# Patient Record
Sex: FEMALE | Race: BLACK OR AFRICAN AMERICAN | NOT HISPANIC OR LATINO | Employment: FULL TIME | RURAL
[De-identification: names, ages, dates, MRNs, and addresses within clinical notes are randomized per-mention and may not be internally consistent; named-entity substitution may affect disease eponyms.]

---

## 2017-02-07 ENCOUNTER — OFFICE VISIT (OUTPATIENT)
Dept: RETAIL CLINIC | Facility: CLINIC | Age: 37
End: 2017-02-07

## 2017-02-07 VITALS
OXYGEN SATURATION: 97 % | SYSTOLIC BLOOD PRESSURE: 124 MMHG | RESPIRATION RATE: 16 BRPM | WEIGHT: 208 LBS | HEIGHT: 61 IN | HEART RATE: 76 BPM | TEMPERATURE: 97.7 F | DIASTOLIC BLOOD PRESSURE: 70 MMHG | BODY MASS INDEX: 39.27 KG/M2

## 2017-02-07 DIAGNOSIS — T16.1XXA FOREIGN BODY IN EAR, RIGHT, INITIAL ENCOUNTER: Primary | ICD-10-CM

## 2017-02-07 PROCEDURE — 99212 OFFICE O/P EST SF 10 MIN: CPT | Performed by: NURSE PRACTITIONER

## 2017-02-08 NOTE — PROGRESS NOTES
"Subjective   Lacrjean-pierrea Cielo Levine is a 36 y.o. female.     Earache    There is pain in the right ear. This is a new problem. Episode onset: x48 hours. The problem occurs every few hours. The problem has been gradually worsening. There has been no fever. The pain is mild. Associated symptoms include headaches ( mild headache on right side today) and hearing loss ( muffled ). Pertinent negatives include no abdominal pain, coughing, diarrhea, ear discharge, neck pain, rash, rhinorrhea, sore throat or vomiting. She has tried nothing for the symptoms. Reports using q-tip to ear on 2-5-17 and believes it may have broken off inside the ear        The following portions of the patient's history were reviewed and updated as appropriate: allergies, current medications, past medical history and past social history.    Review of Systems   Constitutional: Negative.    HENT: Positive for ear pain and hearing loss ( muffled ). Negative for ear discharge, rhinorrhea and sore throat.    Respiratory: Negative.  Negative for cough.    Cardiovascular: Negative.    Gastrointestinal: Negative for abdominal pain, diarrhea and vomiting.   Musculoskeletal: Negative for neck pain.   Skin: Negative for rash.   Neurological: Positive for headaches ( mild headache on right side today).   Hematological: Negative for adenopathy.   Psychiatric/Behavioral: Negative.        Objective    Visit Vitals   • /70   • Pulse 76   • Temp 97.7 °F (36.5 °C) (Tympanic)   • Resp 16   • Ht 61\" (154.9 cm)   • Wt 208 lb (94.3 kg)   • LMP 09/20/2016   • SpO2 97%   • Breastfeeding No   • BMI 39.3 kg/m2       Physical Exam   Constitutional: She appears well-developed and well-nourished. No distress.   HENT:   Head: Normocephalic and atraumatic.   Right Ear: A foreign body ( cotton visualized to mid canal) is present.   Forceps used to remove cotton swab fully intact without difficulty.  Ear canal with no bleeding or irritation. TM normal.    Nursing note and " vitals reviewed.      Assessment/Plan   Carmela was seen today for ear drainage.    Diagnoses and all orders for this visit:    Foreign body in ear, right, initial encounter  Comments:  q-tip    Orders:  -     Foreign Body Removal      Advised to avoid use of cotton swabs inside the ear in the future.   F/U as needed.

## 2017-02-08 NOTE — PATIENT INSTRUCTIONS
Ear Foreign Body  An ear foreign body is an object that is stuck in your ear. The object is usually stuck in the ear canal.  CAUSES  In all ages of people, the most common foreign bodies are insects that enter the ear canal. It is common for young children to put objects into the ear canal. These may include tanya, beads, parts of toys, and any other small objects that fit into the ear. In adults, objects such as cotton swabs may become lodged in the ear canal.   SIGNS AND SYMPTOMS  A foreign body in the ear may cause:  · Pain.  · Buzzing or roaring sounds.  · Hearing loss.  · Ear drainage or bleeding.  · Nausea and vomiting.  · A feeling that your ear is full.  DIAGNOSIS  Your health care provider may be able to diagnose an ear foreign body based on the information that you provide, your symptoms, and a physical exam. Your health care provider may also perform tests, such as testing your hearing and your ear pressure, to check for infection or other problems that are caused by the foreign body in your ear.  TREATMENT  Treatment depends on what the foreign body is, the location of the foreign body in your ear, and whether or not the foreign body has injured any part of your inner ear. If the foreign body is visible to your health care provider, it may be possible to remove the foreign body using:  · A tool, such as medical tweezers (forceps) or a suction tube (catheter).  · Irrigation. This uses water to flush the foreign body out of your ear. This is used only if the foreign body is not likely to swell or enlarge when it is put in water.  If the foreign body is not visible or your health care provider was not able to remove the foreign body, you may be referred to a specialist for removal. You may also be prescribed antibiotic medicine or ear drops to prevent infection. If the foreign body has caused injury to other parts of your ear, you may need additional treatment.  HOME CARE INSTRUCTIONS  · Keep all  follow-up visits as directed by your health care provider. This is important.  · Take medicines only as directed by your health care provider.  · If you were prescribed an antibiotic medicine, finish it all even if you start to feel better.  PREVENTION  · Keep small objects out of reach of young children. Tell children not to put anything in their ears.  · Do not put anything in your ear, including cotton swabs, to clean your ears. Talk to your health care provider about how to clean your ears safely.  SEEK MEDICAL CARE IF:  · You have a headache.  · Your have blood coming from your ear.  · You have a fever.  · You have increased pain or swelling of your ear.  · Your hearing is reduced.  · You have discharge coming from your ear.     This information is not intended to replace advice given to you by your health care provider. Make sure you discuss any questions you have with your health care provider.     Document Released: 12/15/2001 Document Revised: 01/08/2016 Document Reviewed: 08/03/2015  [employer] Patient Education ©2016 [employer]